# Patient Record
Sex: FEMALE | Race: WHITE | NOT HISPANIC OR LATINO | Employment: OTHER | URBAN - METROPOLITAN AREA
[De-identification: names, ages, dates, MRNs, and addresses within clinical notes are randomized per-mention and may not be internally consistent; named-entity substitution may affect disease eponyms.]

---

## 2024-08-23 ENCOUNTER — CLINICAL SUPPORT (OUTPATIENT)
Dept: AUDIOLOGY | Facility: CLINIC | Age: 79
End: 2024-08-23

## 2024-08-23 DIAGNOSIS — H90.3 SENSORINEURAL HEARING LOSS (SNHL) OF BOTH EARS: Primary | ICD-10-CM

## 2024-08-23 NOTE — PROGRESS NOTES
HEARING AID CHECK    RIGHT: Phonak  Audeo P50-R SN: [id number]  : 1 x M  Wax Guard/Dome: Cerushield, Large open  LEFT: Phonak Audeo P50-R  SN: [id number]  : 1 x M  Wax Guard/Dome: Cerushield, Large open    Repair Warranty: 12/05/2024  L&D Warranty: 12/05/2024    Olivia Meyer was seen for a hearing aid check to review changing wax guards.  Changed wax guards and domes in parallel with patient. Patient noted concerns for her devices charging.  She reported that they do not seem to fully charge and do not last her throughout the day.  Patient in agreement to send devices in for repair while under warranty.  Patient to be called when devices are received from repair. Patient satisfied.    NOTE: Through review of patient's chart, she has not had a hearing evaluation since 2021 nor programming changes since 2022.  When contacting patient, will ask if hearing sensitivity has changed, and if so, will have patient return for EPV appointment.    N/c under warranty    RECOMMENDATIONS:  -Recommend patient return in ~6 months or sooner should concerns arise.    Claire Lennon, CCC-A    Appt time: 10:00 - 10:30 AM

## 2024-09-04 ENCOUNTER — DOCUMENTATION (OUTPATIENT)
Dept: AUDIOLOGY | Facility: CLINIC | Age: 79
End: 2024-09-04
Payer: MEDICARE

## 2024-09-04 DIAGNOSIS — H90.3 SENSORINEURAL HEARING LOSS (SNHL) OF BOTH EARS: Primary | ICD-10-CM

## 2024-09-04 NOTE — PROGRESS NOTES
HEARING AID CHECK IN    RIGHT: Phonak  Audeo P50-R SN: [id number]  : 1 x M  Wax Guard/Dome: Cerushield, Large open  LEFT: Phonak Audeo P50-R  SN: [id number]  : 1 x M  Wax Guard/Dome: Cerushield, Large open     Repair Warranty: 12/05/2024  L&D Warranty: 12/05/2024    Olivia Meyer's device/s were received from .  Devices charged and programming confirmed.  Patient was called and discussed that devices programming was confirmed, devices were placed at the , and they were ready for patient to  at her convenience. Additionally, discussed with patient that next appointment, a repeated hearing test should be scheduled to address any changes in hearing since her last evaluation in 2021.  Patient in agreement with this plan.  Devices and  placed at the .    N/c under warranty    Claire Lennon, CCC-A

## 2024-09-09 ENCOUNTER — APPOINTMENT (OUTPATIENT)
Dept: AUDIOLOGY | Facility: CLINIC | Age: 79
End: 2024-09-09
Payer: MEDICARE

## 2024-09-09 ENCOUNTER — CLINICAL SUPPORT (OUTPATIENT)
Dept: AUDIOLOGY | Facility: CLINIC | Age: 79
End: 2024-09-09
Payer: MEDICARE

## 2024-09-09 DIAGNOSIS — H90.A21 SENSORINEURAL HEARING LOSS (SNHL) OF RIGHT EAR WITH RESTRICTED HEARING OF LEFT EAR: Primary | ICD-10-CM

## 2024-09-09 DIAGNOSIS — H90.A32 MIXED CONDUCTIVE AND SENSORINEURAL HEARING LOSS OF LEFT EAR WITH RESTRICTED HEARING OF RIGHT EAR: ICD-10-CM

## 2024-09-09 PROCEDURE — HRANC PR HEARING AID NO CHARGE: Performed by: AUDIOLOGIST

## 2024-09-09 PROCEDURE — 92557 COMPREHENSIVE HEARING TEST: CPT | Performed by: AUDIOLOGIST

## 2024-09-09 NOTE — PROGRESS NOTES
HEARING AID CHECK    RIGHT: Phonak  Audeo P50-R SN: [id number]  : 1 x M  Wax Guard/Dome: Cerushield, Small closed  LEFT: Phonak Audeo P50-R  SN: [id number]  : 1 x M  Wax Guard/Dome: Cerushield,  Small closed     Repair Warranty: 12/05/2024  L&D Warranty: 12/05/2024  Office Visits: 12/05/2024    Olivia Meyer was seen for a hearing aid check following audiometric evaluation appts.  Listening check revealed good working function of devices.  Hearing aids were reprogrammed to today's evaluation.  Domes were changed from open to closed.  Patient satisfied.    N/c under warranty    RECOMMENDATIONS:  -Recommend patient return in ~6 months or sooner should concerns arise.    Claire Lennon, CCC-A    Appt time: 1:30 - 1:45 PM

## 2024-09-09 NOTE — PROGRESS NOTES
COMPREHENSIVE AUDIOMETRIC EVALUATION      Name:  Olivia Meyer  :  1945  Age:  78 y.o.  Date of Evaluation:  24   Referring Provider:  SELF    History:  Ms. Meyer was seen today  for an evaluation of hearing.  Patient reported concerns for decreased hearing sensitivity.  Please recall, patient has a known sensorineural hearing loss, bilaterally, for which she utilizes hearing aids. 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  Questions were addressed and the patient was encouraged to contact our department should concerns arise.    RECOMMENDATIONS:  -Recommend patient return should concerns for changes in hearing sensitivity arise or as medically indicated.   -Recommend patient return to ENT provider or PCP should otalgia, aural fullness, or otologic involvement arise due to slight left mixed component (15 dB HL air bone gap at multiple frequencies)  -Recommend patient continue consistent utilization of hearing aids    Claire Lennon, CCC-A     Appt: 1:00 -  1:30 PM

## 2025-01-15 ENCOUNTER — TELEPHONE (OUTPATIENT)
Dept: AUDIOLOGY | Facility: CLINIC | Age: 80
End: 2025-01-15
Payer: MEDICARE

## 2025-01-15 NOTE — TELEPHONE ENCOUNTER
Patient called and left a message asking about her insurance. Discussed with patient that [employer] typically works with a third party payer though she could still pay out of pocket for hearing aid visits.  Patient understood and satisfied.    Claire Lennon, CCC-A

## 2025-07-23 ENCOUNTER — CLINICAL SUPPORT (OUTPATIENT)
Dept: AUDIOLOGY | Facility: CLINIC | Age: 80
End: 2025-07-23
Payer: MEDICARE

## 2025-07-23 DIAGNOSIS — H90.A21 SENSORINEURAL HEARING LOSS (SNHL) OF RIGHT EAR WITH RESTRICTED HEARING OF LEFT EAR: Primary | ICD-10-CM

## 2025-07-23 DIAGNOSIS — H90.A32 MIXED CONDUCTIVE AND SENSORINEURAL HEARING LOSS OF LEFT EAR WITH RESTRICTED HEARING OF RIGHT EAR: ICD-10-CM

## 2025-07-23 PROCEDURE — V5014 HEARING AID REPAIR/MODIFYING: HCPCS | Performed by: AUDIOLOGIST

## 2025-07-25 ENCOUNTER — TELEPHONE (OUTPATIENT)
Dept: AUDIOLOGY | Facility: CLINIC | Age: 80
End: 2025-07-25
Payer: MEDICARE

## 2025-07-25 NOTE — TELEPHONE ENCOUNTER
Patient called and noted that her hearing aid  is not working.  Attempted to call patient back, though no answer.  Left message stating that a new  could be ordered for patient and quoted $100. Asked patient return call with what she would like to do as next steps.    Claire Lennon, CCC-A

## 2025-07-28 ENCOUNTER — TELEPHONE (OUTPATIENT)
Dept: AUDIOLOGY | Facility: CLINIC | Age: 80
End: 2025-07-28
Payer: MEDICARE

## 2025-07-28 NOTE — TELEPHONE ENCOUNTER
Olivia called and left a message that she thought her  was in good working order and asked that I explain.  Attempted to call patient back and noted that without seeing the hearing aids or , I could not further help her with her issue as I am unclear what the concern is.  Asked patient to drop off her hearing aids and  or schedule an appointment.    Claire Lennon, CCC-A

## 2025-08-25 ENCOUNTER — TELEPHONE (OUTPATIENT)
Dept: AUDIOLOGY | Facility: CLINIC | Age: 80
End: 2025-08-25
Payer: MEDICARE